# Patient Record
(demographics unavailable — no encounter records)

---

## 2025-05-13 NOTE — SIGNATURES
[TextEntry] : This note was written by Minnie Burkett on 05/13/2025 actively solely TIMOTHY Mooney M.D  05/13/2025. All medical record entries made by this scribe were at my  TIMOTHY Mooney M.D  direction and personally dictated by me on  05/13/2025. I have personally reviewed my chart and agree that the record reflects my personal performance of the history, physical exam, assessment, and plan.

## 2025-05-13 NOTE — HISTORY OF PRESENT ILLNESS
[FreeTextEntry1] : 29 year old P1 female presents for amenorrhea. She is doing well and has no complaints. She denies vb, cramping, nausea or vomiting. Pt had US today 5/13/25 - SLIUP at 5 weeks 5 days. KEVIN 1/7/26. Faint fetal pole noted and no fetal heart beat seen. H/o PEC.

## 2025-05-13 NOTE — PHYSICAL EXAM
[MA] : MA [Appropriately responsive] : appropriately responsive [Alert] : alert [No Acute Distress] : no acute distress [No Lymphadenopathy] : no lymphadenopathy [Clear to Auscultation B/L] : clear to auscultation bilaterally [Soft] : soft [Non-tender] : non-tender [Non-distended] : non-distended [No HSM] : No HSM [No Lesions] : no lesions [No Mass] : no mass [Examination Of The Breasts] : a normal appearance [No Masses] : no breast masses were palpable [Labia Majora] : normal [Labia Minora] : normal [Normal] : normal [Uterine Adnexae] : normal [FreeTextEntry2] : Jojo

## 2025-05-13 NOTE — PLAN
[FreeTextEntry1] : Discussed possibility of early pregnancy. Routine new OB labs and counseling. Welcome letter given. The group's support staff and coverage arrangement discussed. Reviewed prenatal screening.   -Initial PE done today -Discussed bASA; Pt advised to start taking bASA at 12 weeks -OB blood work today. -RTO in 5 weeks